# Patient Record
(demographics unavailable — no encounter records)

---

## 2025-02-20 NOTE — HISTORY OF PRESENT ILLNESS
[FreeTextEntry1] : RP - annual skin check [de-identified] : Dion Acuna 47 y/o M presents for f/u pt is here for annual skin check no concerns at [facility] BCC on R nasal ala, s/p Mohs with Dr. Mendez. Healing well. had a skin lesion on the R upper eyelid  personal hx of BCC R lower chest and BCC R upper chest s/p shave biopsy with negative margins wears sunscreen SH:  for [employer], spends most of the time outdoors at work Statement Selected

## 2025-02-20 NOTE — ASSESSMENT
[FreeTextEntry1] : #Hx of BCC x 3 most recent R nasal ala s/p mohs Dr. Mendez 7/2024 tbse q 6 mos  #Multiple benign nevi - chronic, stable - I discussed the chronic nature and course of the condition - Photoprotection discussed, recommend daily broad-spectrum sunscreen, SPF 30 or greater, UPF hat, clothing. - Pt educated on ABCDE of melanoma - Recommend self-skin exam and annual skin exam by MD - Pt instructed to return for new or changing lesions especially if any moles start to change, itch, or bleed   # Seborrheic keratosis, trunk/extremities  - chronic, stable -I discussed the chronic nature and course of the condition -reviewed benign nature   RTC 6 mos for tbse

## 2025-02-20 NOTE — PHYSICAL EXAM
[Alert] : alert [Oriented x 3] : ~L oriented x 3 [Full Body Skin Exam Performed] : performed [FreeTextEntry3] : PE:   General: well-appearing, alert, in no acute distress   Full body skin exam performed examining scalp, head, face, ears, eyes, mouth, neck, chest, back, abdomen, axilla, b/l arms, b/l forearms, b/l hands, b/l fingernails, b/l thighs, b/l legs, b/l feet, b/l toenails, groin, buttocks Pertinent findings include: -well healed scars on R nasal ala, R lower chest, R anterior shoulder -scattered light brown to dark brown colored <6mm papules and macules without any irregular border, color, or asymmetry on the trunk and extremities, consistent with benign nevi. -brown stuck on papules, plaques on the trunk and extremities, R upper eyelid

## 2025-07-29 NOTE — PHYSICAL EXAM
[No Acute Distress] : no acute distress [Normal Sclera/Conjunctiva] : normal sclera/conjunctiva [Normal Outer Ear/Nose] : the outer ears and nose were normal in appearance [No Respiratory Distress] : no respiratory distress  [Non-distended] : non-distended [No Masses] : no abdominal mass palpated [No Joint Swelling] : no joint swelling [No Rash] : no rash [Coordination Grossly Intact] : coordination grossly intact [Normal Insight/Judgement] : insight and judgment were intact [de-identified] : mild left inguinal tenderness [de-identified] : 3 mm erythematous lesion on penis

## 2025-07-29 NOTE — HISTORY OF PRESENT ILLNESS
[de-identified] : 48 yo male here for complaint of left sided groin pain for few months where he had inguinal surgery in the past. When he presses on the area he feels some discomfort. Worse with lifting at his job.  Also noticing new lesion on penis since Saturday. Some discomfort. New sexual partner 2 weeks ago.  Saw optho for floaters/headaches. Now taking b vitamins, Coq10, and magnesium with improvement in headaches.